# Patient Record
(demographics unavailable — no encounter records)

---

## 2024-11-11 NOTE — DISCUSSION/SUMMARY
[de-identified] : Assessment: 52-year-old female with left shoulder pain secondary to calcific tendinitis/bursitis  Plan: I had a long discussion with the patient today regarding the nature of their diagnosis and treatment plan. We discussed the risks and benefits of no treatment as well as nonoperative and operative treatments.  I reviewed the patient's x-rays today with her in the office which demonstrate a tiny calcium deposit about the greater tuberosity consistent with calcific tendinitis.  At this time I recommend conservative treatment of the patient's condition with modalities including rest, ice, heat, anti-inflammatory medications, activity modifications, and home stretching and strengthening exercises.  A prescription for meloxicam was sent to the patient's pharmacy today.  I discussed with the patient the risks and benefits associated with NSAID use. GI precautions were discussed.  Patient was provided with a physician directed home exercise program which she will perform 2 to 3 days a week for the next 6 to 8 weeks.  She will follow-up in 8 weeks as needed.  Symptoms persist we may consider a cortisone injection versus an MRI.  The patient verbalizes their understanding and agrees with the plan.  All questions were answered to their satisfaction.   I, Dr. Ellis, personally performed the evaluation and management (E/M) services for this new patient.  That E/M includes conducting the clinically appropriate initial history &/or exam, assessing all conditions, and establishing the plan of care.  Today, my VINCENZO, was here to observe my evaluation and management service for this patient & follow plan of care established by me going forward.

## 2024-11-11 NOTE — PHYSICAL EXAM
[de-identified] : General: Awake, alert, no acute distress, Patient was cooperative and appropriate during the examination.  The patient is of normal weight for height and age.  Walks without an antalgic gait.   Left shoulder Exam: Physical exam of the shoulder demonstrates normal skin without signs of skin changes or abnormalities. No erythema, warmth, or joint effusion appreciated.  Sensation intact light touch C5-T1 Palpable radial pulse Radial/ulnar/median/axillary/musculocutaneous/AIN/PIN nerves grossly intact  Range of motion: Forward Flexion: 175 Abduction: 140 External Rotation: 45 Internal Rotation: L3  Palpation: Not tender to palpation over the glenohumeral joint Moderately tender palpation over the rotator cuff insertion on the greater tuberosity Not tender to palpation over the AC joint Mildly tender to palpation of the biceps tendon/bicipital groove  Strength testing: Supraspinatus: 5/5 Infraspinatus: 5/5 Subscapularis: 5/5  Special test: Empty can test positive [place] impingement test positive Speeds test negative [place]'s test negative Lift-off test negative Bell-press test negative Cross-arm adduction test negative   [de-identified] : X-rays including 4 views of the left shoulder were obtained in the office on 11/11/2024 and reviewed with the patient.  No acute fracture or dislocation.  No arthritis.  Tiny calcium deposit noted adjacent to the greater tuberosity consistent with calcific tendinitis.

## 2024-11-11 NOTE — HISTORY OF PRESENT ILLNESS
[de-identified] : 11/11/2024: Bessy is a pleasant 52-year-old female who presents to the office today for evaluation of left shoulder pain.  The patient states that her pain began about 3 weeks ago while doing lateral raises in the gym.  She had a sharp pain at that time and since then has had persistent discomfort on the outer part of her left shoulder.  She has never injured the shoulder before.  The pain is activity related and worse with overhead reaching.  The pain is alleviated by rest.  She has not had any formal treatment.  She has never injured the shoulder before.  The patient denies any fevers, chills, sweats, recent illnesses, numbness, tingling, weakness, or pain elsewhere at this time.

## 2025-05-23 NOTE — HISTORY OF PRESENT ILLNESS
[postmenopausal] : postmenopausal [N] : Patient does not use contraception [Y] : Positive pregnancy history [LMP unknown] : LMP unknown [unknown] : Patient is unsure of the date of her LMP [No] : Patient does not have concerns regarding sex [Currently Active] : currently active [Men] : men [TextBox_4] : 52 -year old  4 para 3013 postmenopausal presents for an annual examination. Review of systems are negative. [Mammogramdate] : 04/25/24 [TextBox_19] : BR-2 [BreastSonogramDate] : 04/25/24 [BoneDensityDate] : N/A [ColonoscopyDate] : N/A [PGHxTotal] : 4 [[facility]] : 3 [[facility]] : 3 [PGHxABSpont] : 1

## 2025-05-23 NOTE — PHYSICAL EXAM
[Oriented x3] : oriented x3 [Examination Of The Breasts] : a normal appearance [No Masses] : no breast masses were palpable [Labia Majora] : normal [Labia Minora] : normal [Normal] : normal [Uterine Adnexae] : normal [Absent] : absent [FreeTextEntry2] :  Appropriately responsive, alert, and no acute distress. [FreeTextEntry3] :  Thyroid is non-enlarged, nontender. No palpable nodules or goiter. No lymphadenopathy. [FreeTextEntry7] :  Soft. Nondistended. Nontender. No rebound or guarding. There are no palpable masses. [FreeTextEntry1] :  External genitalia are within normal limits with no lesions visualized. [FreeTextEntry4] :  No vaginal discharge, blood or any lesions noted within the vaginal vault. [FreeTextEntry5] :  A speculum was inserted without any difficulty. Cervix was absent status post total abdominal hysterectomy. [FreeTextEntry6] : Uterus was absent status post total abdominal hysterectomy.  There were no palpable adnexal masses or adnexal tenderness. [FreeTextEntry9] :  No lesions. No palpable masses.

## 2025-07-14 NOTE — ADDENDUM
[FreeTextEntry1] : Katherin SANDRA NP, am scribing for and the presence of Dr. Brittany Cagle  the following sections HISTORY OF PRESENT ILLNESSS, PAST MEDICAL/FAMILY/SOCIAL HISTORY; REVIEW OF SYSTEMS; VITAL SIGNS; PHYSICAL EXAM; DISPOSITION.

## 2025-07-14 NOTE — PHYSICAL EXAM

## 2025-07-14 NOTE — REASON FOR VISIT
[Initial Evaluation] : an initial evaluation [FreeTextEntry1] : colon cancer screening, epigastric pain, bloating

## 2025-07-14 NOTE — HISTORY OF PRESENT ILLNESS
[FreeTextEntry1] : BREN HARTLEY is a 52 year old female with PMH of URIEL, presenting today for colon cancer screening and evaluation of upper GI complaints. She has never had a colonoscopy before. Family history is negative for colon cancer and polyps. She denies abdominal pain, bowel changes, rectal bleeding. She moves her bowels daily. She complains of epigastric discomfort and bloating after meals at least 3-4 times a week. Symptoms are worse with certain foods like tomato sauce. She uses Tums PRN with good relief. No prior EGD. She denies nausea, vomiting, dysphagia, weight loss, loss of appetite.

## 2025-07-14 NOTE — ASSESSMENT
[FreeTextEntry1] : 52 year old female of average risk for colorectal neoplasm presenting for colon cancer screening and evaluation of upper GI complaints. She will be scheduled for an EGD and colonoscopy for further evaluation.  I have discussed the indications, alternatives, benefits and potential risk including, but not limited to, bleeding, perforation, missed lesions and anesthesia complications. Bowel prep instructions discussed at length. All questions were answered. Pt is medically optimized.   Suflave prep  Start Famotidine 40 mg QD PRN for GERD symptoms GERD diet discussed  Obtain labs from PCP for review I, Dr. Brittany Cagle was present during the history and physical Patient having epigastric burning occurs intermittently Abdominal exam-positive bowel sounds soft nontender Agree with the above assessment and plan-colonoscopy endoscopy.  GERD screening colonoscopy